# Patient Record
Sex: FEMALE | Race: WHITE | NOT HISPANIC OR LATINO | Employment: OTHER | URBAN - METROPOLITAN AREA
[De-identification: names, ages, dates, MRNs, and addresses within clinical notes are randomized per-mention and may not be internally consistent; named-entity substitution may affect disease eponyms.]

---

## 2017-01-09 ENCOUNTER — OFFICE VISIT (OUTPATIENT)
Dept: PULMONOLOGY | Facility: HOSPICE | Age: 67
End: 2017-01-09
Payer: MEDICARE

## 2017-01-09 ENCOUNTER — OFFICE VISIT (OUTPATIENT)
Dept: PULMONOLOGY | Facility: HOSPICE | Age: 67
End: 2017-01-09
Attending: INTERNAL MEDICINE
Payer: MEDICARE

## 2017-01-09 VITALS
WEIGHT: 106 LBS | DIASTOLIC BLOOD PRESSURE: 70 MMHG | HEIGHT: 60 IN | BODY MASS INDEX: 20.81 KG/M2 | SYSTOLIC BLOOD PRESSURE: 112 MMHG | RESPIRATION RATE: 16 BRPM | HEART RATE: 84 BPM | TEMPERATURE: 97.5 F

## 2017-01-09 VITALS — WEIGHT: 104 LBS | BODY MASS INDEX: 20.31 KG/M2

## 2017-01-09 DIAGNOSIS — C13.9 SQUAMOUS CELL CANCER OF HYPOPHARYNX (HCC): ICD-10-CM

## 2017-01-09 DIAGNOSIS — R06.02 SOB (SHORTNESS OF BREATH): ICD-10-CM

## 2017-01-09 PROCEDURE — 94726 PLETHYSMOGRAPHY LUNG VOLUMES: CPT | Performed by: INTERNAL MEDICINE

## 2017-01-09 PROCEDURE — 94010 BREATHING CAPACITY TEST: CPT | Performed by: INTERNAL MEDICINE

## 2017-01-09 PROCEDURE — 94729 DIFFUSING CAPACITY: CPT | Performed by: INTERNAL MEDICINE

## 2017-01-09 PROCEDURE — 99214 OFFICE O/P EST MOD 30 MIN: CPT | Mod: 25 | Performed by: INTERNAL MEDICINE

## 2017-01-09 ASSESSMENT — 6 MINUTE WALK TEST (6MWT)
SAO2 AT 4 MIN: 97
SAO2 AT 3 MIN: 97
DISTANCE WALKED (FT): 180
HEART RATE AT 5 MIN: 98
NUMBER OF RESTS: 0
PERCEIVED FATIGUE AT 4 MIN: 0
HEART RATE AT 1 MIN: 98
PERCEIVED BREATHLESSNESS AT 6 MIN: 2
PERCEIVED BREATHLESSNESS AT 5 MIN: 2
DISTANCE WALKED (FT): 180
SAO2 AT 5 MIN: 96
PERCEIVED FATIGUE AT 1 MIN: 0
SAO2 AT 6 MIN: 98
PERCEIVED FATIGUE AT 3 MIN: 0
BLOOD PRESSURE AT 1 MIN: 112/64
PERCEIVED BREATHLESSNESS AT 1 MIN: 2
HEART RATE AT 4 MIN: 96
PERCEIVED FATIGUE AT 2 MIN: 0
DISTANCE WALKED (FT): 160
PERCEIVED FATIGUE AT 6 MIN: 0
SAO2 AT 1 MIN: 98
PERCEIVED BREATHLESSNESS AT 2 MIN: 0
DISTANCE WALKED (FT): 220
PERCEIVED BREATHLESSNESS AT 4 MIN: 2
HEART RATE AT 3 MIN: 96
SITTING BLOOD PRESSURE: 110/62
PERCEIVED FATIGUE AT 2 MIN: 0
O2 SAT PERCENT ROOM AIR: 96
AMBULATES WITH O2: WITHOUT O2
COMMENTS: TEST ON ROOM AIR
PERCEIVED FATIGUE AT 1 MIN: 0
PERCEIVED BREATHLESSNESS AT 3 MIN: 2
BLOOD PRESSURE: LEFT ARM
HEART RATE AT 2 MIN: 82
HEART RATE: 80
DISTANCE WALKED (FT): 180
HEART RATE AT 6 MIN: 98
PERCENT OF NORMAL WALKED: 65
PERCEIVED FATIGUE AT 5 MIN: 0
TOTAL DISTANCE WALKED: 1080
PERCEIVED BREATHLESSNESS AT 1 MIN: 2
HEART RATE AT 2 MIN: 96
SAO2 AT 2 MIN: 97
TOTAL REST TIME: 0
SAO2 AT 1 MIN: 97
DISTANCE WALKED (FT): 160
SAO2 AT 2 MIN: 96
HEART RATE AT 1 MIN: 98
PERCEIVED BREATHLESSNESS AT 2 MIN: 2

## 2017-01-09 ASSESSMENT — PULMONARY FUNCTION TESTS
FEV1: 2.58
FEV1_PREDICTED: 2.04
FEV1/FVC: 80
FEV1/FVC_PERCENT_PREDICTED: 105
FEV1_PERCENT_PREDICTED: 126
FVC: 3.24
FVC_PERCENT_PREDICTED: 120
FVC_PREDICTED: 2.68
FEV1/FVC_PERCENT_PREDICTED: 76

## 2017-01-09 NOTE — PROCEDURES
Technician: LARRY Vargas    Technician Comment:  Good patient effort & cooperation.  The results of this test meet the ATS/ERS standards for acceptability & reproducibility.  Test was performed on the [employer] Body Plethysmograph-Elite DX system.  Predicted values were [facility]-3 for spirometry, [facility] for DLCO, ITS for Lung Volumes.  The DLCO was uncorrected for Hgb.  Pt politely declined use of bronchodilators today due to chemical sensitivities.      The FVC is 3.24 L or 120%, FEV1 is 2.58 L or 126%, FEV1/FVC 79%. %. DLCO 150%.    Interpretation:  Normal pulmonary function.  Normal flow volume loop.

## 2017-01-09 NOTE — PROCEDURES
Test on Room Air  This is a 6 minute walking test done in room.  1. Pretest O2 saturation was 96%, pretest blood pressure was 110/62, pretest heart rate was 18 bpm  2. The patient completed 6 minutes walking. He walked 1018 feet which is 65% of normal walked. His maximum heart rate was reached and minute 5 at 98 beats per minutes. His O2 saturation remained above 95% for the entire period. The patient recovered quickly to the baseline. Blood pressure after one minute recovery was 112/64.

## 2017-01-09 NOTE — MR AVS SNAPSHOT
"        Lyssa Jessy Design   2017 12:30 PM   Office Visit   MRN: [id number]    Department:  [facility]   Dept Phone:  [phone]    Description:  Female : 1950   Provider:  Tho Negrete M.D.           Allergies as of 2017     Allergen Noted Reactions    Benadryl [Diphenhydramine Hcl] 2012   Anxiety    Diphenhydramine Hcl 2016       Fentanyl 2012   Nausea    Gabapentin 2016       Percocet [Oxycodone-Acetaminophen] 2012       Phenergan [Promethazine Hcl] 07/15/2012       \" feeling high and weird\"    Prazosin 2016       Suprax 2016       Zoloft 2016         You were diagnosed with     SOB (shortness of breath)   [888689]         Vital Signs     Smoking Status                   Never Smoker            Basic Information     Date Of Birth Sex Race Ethnicity Preferred Language    1950 Female White Non- English      Your appointments     2017  1:00 PM   Pulmonary Function Test with PFT-RM3   [facility] (--)    [address]  [facility] [address]  [place] [id number] [phone]            2017  2:20 PM   Established Patient Pul with Dayna Flor M.D.   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      Below and/or attached are the medications your provider expects you to take. Review all of your home medications and newly ordered medications with your provider and/or pharmacist. Follow medication instructions as directed by your provider and/or pharmacist. Please keep your medication list with you and share with your provider. Update the information when medications are discontinued, doses are changed, or new medications (including over-the-counter products) are added; and carry medication information at all times in the event of emergency situations     Allergies:  BENADRYL - Anxiety     DIPHENHYDRAMINE HCL - (reactions not documented)     FENTANYL - Nausea     GABAPENTIN - (reactions not documented)     PERCOCET - (reactions not documented)     PHENERGAN - (reactions not documented)     PRAZOSIN - (reactions not documented)     SUPRAX - (reactions not documented)     ZOLOFT - (reactions not documented)               Medications  Valid as of: January 09, 2017 - 12:54 PM    Generic Name Brand Name Tablet Size Instructions for use    5-Hydroxytryptophan (Cap) 5- MG Take 1 mg by mouth every day.        Amantadine HCl (Cap) SYMMETREL 100 MG Take 100 mg by mouth 2 times a day.        Ascorbic Acid (Tab) Vitamin C 1000 MG Take 1,000 mg by mouth every day.        Astaxanthin (Cap) Astaxanthin 4 MG Take 1 tablet by mouth 3 times a day.        Cetuximab (Solution) ERBITUX 100 MG/50ML by Intravenous route.        Cholecalciferol (Tab) Vitamin D3 5000 UNITS Take 1 tablet by mouth every day.        Coenzyme Q10 (Cap) CoQ10 100 MG Take 1 tablet by mouth every day.        GuaiFENesin (TABLET SR 12 HR) MUCINEX 600 MG Take 600 mg by mouth every 12 hours.        Hydrocodone-Acetaminophen (Solution) LORTAB 7.5-500 MG/15ML Take 10 mL by mouth 4 times a day as needed.         HYDROmorphone HCl (Tab) DILAUDID 2 MG Take 2-4 mg by mouth every 3 hours as needed. maximum 12tabs in 24 hours         L-Methylfolate   Take 15 mg by mouth every day.        Levothyroxine Sodium (Tab) SYNTHROID 50 MCG Take 50 mcg by mouth Every morning on an empty stomach.        Loratadine (Tab) CLARITIN 10 MG Take 10 mg by mouth Once. Prior to chemo        LORazepam (Tab) ATIVAN 1 MG Take 1 mg by mouth 1 time daily as needed. Prior to radiation treatment.        maalox plus-benadryl-xylocaine (MBX) (Suspension) MBX  Take 5 mL by mouth every 6 hours as needed.        Melatonin (Tab) Melatonin 5 MG Take 5 mg by mouth every evening.        Metoclopramide HCl (Solution) REGLAN 5 MG/5ML Take 5 mg by mouth 4 Times a Day,Before Meals and at Bedtime.        ondansetron (ZOFRAN) 32 mg/50 mL SOLN (Solution) ZOFRAN 32 mg/50 mL 32 mg by Intravenous route Once.        Ondansetron HCl (Tab) ZOFRAN 8 MG Take 1 Tab by mouth every 8 hours as needed for Nausea/Vomiting.        Prochlorperazine (Suppos) COMPAZINE 25 MG Insert 1 Suppository in rectum every 6 hours as needed for Nausea/Vomiting.        .                 Medicines prescribed today were sent to:     None      Medication refill instructions:       If your prescription bottle indicates you have medication refills left, it is not necessary to call your provider’s office. Please contact your pharmacy and they will refill your medication.    If your prescription bottle indicates you do not have any refills left, you may request refills at any time through one of the following ways: The online [employer] system (except Urgent Care), by calling your provider’s office, or by asking your pharmacy to contact your provider’s office with a refill request. Medication refills are processed only during regular business hours and may not be available until the next business day. Your provider may request additional information or to have a follow-up visit with you prior to refilling your  medication.   *Please Note: Medication refills are assigned a new Rx number when refilled electronically. Your pharmacy may indicate that no refills were authorized even though a new prescription for the same medication is available at the pharmacy. Please request the medicine by name with the pharmacy before contacting your provider for a refill.           [email or web address] Access Code: Activation code not generated  Current [email or web address] Status: Active

## 2017-01-09 NOTE — MR AVS SNAPSHOT
"        Lyssa Jiménez Design   2017 1:00 PM   Office Visit   MRN: [id number]    Department:  [facility]   Dept Phone:  [phone]    Description:  Female : 1950   Provider:  Dayna Flor M.D.           Allergies as of 2017     Allergen Noted Reactions    Benadryl [Diphenhydramine Hcl] 2012   Anxiety    Diphenhydramine Hcl 2016       Fentanyl 2012   Nausea    Gabapentin 2016       Percocet [Oxycodone-Acetaminophen] 2012       Phenergan [Promethazine Hcl] 07/15/2012       \" feeling high and weird\"    Prazosin 2016       Remeron [Mirtazapine] 2017       Mouth sores    Suprax 2016       Zoloft 2016       Muscle stiffness and twicthing      You were diagnosed with     SOB (shortness of breath)   [196040]         Vital Signs     Weight Smoking Status                47.174 kg (104 lb) Never Smoker           Basic Information     Date Of Birth Sex Race Ethnicity Preferred Language    1950 Female White Non- English      Your appointments     2017  2:20 PM   Established Patient Pul with Dayna Flor M.D.   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 Review all of your home medications and newly ordered medications with your provider and/or pharmacist. Follow medication instructions as directed by your provider and/or pharmacist. Please keep your medication list with you and share with your provider. Update the information when medications are discontinued, doses are changed, or new medications (including over-the-counter products) are added; and carry medication information at all times in the event of emergency situations     Allergies:  BENADRYL - Anxiety     DIPHENHYDRAMINE HCL - (reactions not documented)     FENTANYL - Nausea     GABAPENTIN - (reactions not documented)     PERCOCET - (reactions not documented)     PHENERGAN - (reactions not documented)     PRAZOSIN - (reactions not documented)     REMERON - (reactions not documented)     SUPRAX - (reactions not documented)     ZOLOFT - (reactions not documented)               Medications  Valid as of: January 09, 2017 -  2:07 PM    Generic Name Brand Name Tablet Size Instructions for use    5-Hydroxytryptophan (Cap) 5- MG Take 1 mg by mouth every day.        Amantadine HCl (Cap) SYMMETREL 100 MG Take 100 mg by mouth 2 times a day.        Ascorbic Acid (Tab) Vitamin C 1000 MG Take 1,000 mg by mouth every day.        Astaxanthin (Cap) Astaxanthin 4 MG Take 1 tablet by mouth 4 times a day as needed.        Cetuximab (Solution) ERBITUX 100 MG/50ML by Intravenous route.        Cholecalciferol (Tab) Vitamin D3 5000 UNITS Take 1 tablet by mouth every day.        Coenzyme Q10 (Cap) CoQ10 100 MG Take 1 tablet by mouth every day.        GuaiFENesin (TABLET SR 12 HR) MUCINEX 600 MG Take 600 mg by mouth every 12 hours.        Hydrocodone-Acetaminophen (Solution) LORTAB 7.5-500 MG/15ML Take 10 mL by mouth 4 times a day as needed.       HYDROmorphone HCl (Tab) DILAUDID 2 MG Take 2-4 mg by mouth every 3 hours as needed. maximum 12tabs in 24 hours         L-Methylfolate   Take 15 mg by mouth every day.        L-Methylfolate (Tab) L-methylfolate Calcium 15 MG TK 1 T PO QD        Levothyroxine Sodium (Tab) SYNTHROID 50 MCG Take 50 mcg by mouth Every morning on an empty stomach.        Loratadine (Tab) CLARITIN 10 MG Take 10 mg by mouth Once. Prior to chemo        LORazepam (Tab) ATIVAN 1 MG Take 1 mg by mouth 1 time daily as needed. Prior to radiation treatment.        maalox plus-benadryl-xylocaine (MBX) (Suspension) MBX  Take 5 mL by mouth every 6 hours as needed.        Melatonin (Tab) Melatonin 5 MG Take 15 mg by mouth every evening.        Metoclopramide HCl (Solution) REGLAN 5 MG/5ML Take 5 mg by mouth 4 Times a Day,Before Meals and at Bedtime.        ondansetron (ZOFRAN) 32 mg/50 mL SOLN (Solution) ZOFRAN 32 mg/50 mL 32 mg by Intravenous route Once.        Ondansetron HCl (Tab) ZOFRAN 8 MG Take 1 Tab by mouth every 8 hours as needed for Nausea/Vomiting.        Prochlorperazine (Suppos) COMPAZINE 25 MG Insert 1 Suppository in rectum every 6 hours as needed for Nausea/Vomiting.        .                 Medicines prescribed today were sent to:     [facility] [address] [place] [address] [address] [facility] [address] [facility] [address]    [address] [facility] [address]    Phone: [phone] Fax: [phone]    Open 24 Hours?: No      Medication refill instructions:       If your prescription bottle indicates you have medication refills left, it is not necessary to call your provider’s office. Please contact your pharmacy and they will refill your medication.    If your prescription bottle indicates you do not have any refills left, you may request refills at any time through one of the following ways: The online [employer] system (except Urgent Care), by calling your provider’s office, or by asking your pharmacy to contact your provider’s  office with a refill request. Medication refills are processed only during regular business hours and may not be available until the next business day. Your provider may request additional information or to have a follow-up visit with you prior to refilling your medication.   *Please Note: Medication refills are assigned a new Rx number when refilled electronically. Your pharmacy may indicate that no refills were authorized even though a new prescription for the same medication is available at the pharmacy. Please request the medicine by name with the pharmacy before contacting your provider for a refill.           [email or web address] Access Code: Activation code not generated  Current [email or web address] Status: Active

## 2017-01-09 NOTE — PROGRESS NOTES
"Chief Complaint   Patient presents with   • Results     PFT,6MW results.       HPI: This patient is a 66 y.o. Female who returns for shortness of breath. 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 She had smoked marijuana for many years, denies any tobacco use. Following her treatments, she had felt short of breath with activities such as \"walking and talking\". She also had a cough. She denied associated chest pain, wheezing or hemoptysis. All of these symptoms have improved spontaneously in recent months. She is more ambulatory, exercising more and feeling better. She denies any aspiration however has to be cautious with swallowing. She denies a history of childhood asthma, bronchitis, or pneumonia. Pulmonary function testing today showed normal lung function, with FEV1 2.58 L or 126%. DLCO was normal at 150%. 6 minute walk test showed excellent oxygen saturations with exercise, and she walked over 1000 feet.  She is followed by Dr. Noel, ENT and Dr. Childers, radiation oncologist.  Chest CAT scan 02/16 showed:  1. Hyperexpanded lungs and apical scarring. No consolidation or mass identified.  2. 5.9 mm short axis subcarinal and smaller mediastinal and hilar lymph nodes.      Past Medical History   Diagnosis Date   • Arthritis    • Snoring    • Pain 05-25-12     Left neck, head, left ear, 6/10   • Psychiatric problem      depression   • Scoliosis    • TMJ syndrome    • Cancer (HCC) 05-25-12     squamos cell in base of tongue   • Back pain    • Depression    • Hypothyroidism    • Chickenpox    • [place] measles    • Influenza    • Mumps    • Fatigue    • Wears glasses    • Painful joint    • Sore muscles    • Dizziness        Social History     Social History   • Marital Status:      Spouse Name: N/A   • Number of Children: N/A   • Years of Education: N/A     Occupational History   • Not " on file.     Social History Main Topics   • Smoking status: Never Smoker    • Smokeless tobacco: Never Used   • Alcohol Use: No   • Drug Use: No   • Sexual Activity: Not on file     Other Topics Concern   • Not on file     Social History Narrative       Family History   Problem Relation Age of Onset   • Lung Cancer Father    • Cancer Brother    • Breast Cancer Daughter        Current Outpatient Prescriptions on File Prior to Visit   Medication Sig Dispense Refill   • Astaxanthin 4 MG Cap Take 1 tablet by mouth 4 times a day as needed.     • levothyroxine (SYNTHROID) 50 MCG Tab Take 50 mcg by mouth Every morning on an empty stomach.     • L-METHYLFOLATE PO Take 15 mg by mouth every day.     • 5-Hydroxytryptophan (5-HTP) 100 MG Cap Take 1 mg by mouth every day.     • Melatonin 5 MG Tab Take 15 mg by mouth every evening.     • Coenzyme Q10 (COQ10) 100 MG Cap Take 1 tablet by mouth every day.     • Cholecalciferol (VITAMIN D3) 5000 UNITS Tab Take 1 tablet by mouth every day.     • Ascorbic Acid (VITAMIN C) 1000 MG Tab Take 1,000 mg by mouth every day.     • loratadine (CLARITIN) 10 MG TABS Take 10 mg by mouth Once. Prior to chemo     • hydrocodone-acetaminophen 2.5-167 mg/5 mL solution (LORTAB) 7.5-500 MG/15ML SOLN Take 10 mL by mouth 4 times a day as needed.     • maalox plus-benadryl-xylocaine (MBX) Take 5 mL by mouth every 6 hours as needed.     • metoclopramide (REGLAN) 5 MG/5ML SOLN Take 5 mg by mouth 4 Times a Day,Before Meals and at Bedtime.     • amantadine (SYMMETREL) 100 MG CAPS Take 100 mg by mouth 2 times a day.     • guaifenesin LA (MUCINEX) 600 MG TB12 Take 600 mg by mouth every 12 hours.     • cetuximab (ERBITUX) 100 MG/50ML SOLN by Intravenous route.     • ondansetron (ZOFRAN) 32 mg/50 mL SOLN 32 mg by Intravenous route Once.     • HYDROmorphone (DILAUDID) 2 MG TABS Take 2-4 mg by mouth every 3 hours as needed. maximum 12tabs in 24 hours      • ondansetron (ZOFRAN) 8 MG TABS Take 1 Tab by mouth every 8  hours as needed for Nausea/Vomiting. (Patient not taking: Reported on 1/9/2017) 20 Each 0   • prochlorperazine (COMPAZINE) 25 MG SUPP Insert 1 Suppository in rectum every 6 hours as needed for Nausea/Vomiting. (Patient not taking: Reported on 1/9/2017) 10 Each 0   • lorazepam (ATIVAN) 1 MG TABS Take 1 mg by mouth 1 time daily as needed. Prior to radiation treatment.       No current facility-administered medications on file prior to visit.       Allergies: Benadryl; Diphenhydramine hcl; Fentanyl; Gabapentin; Percocet; Phenergan; Prazosin; Remeron; Suprax; and Zoloft    ROS:   Constitutional: Denies fevers, chills, night sweats, fatigue or weight loss  Eyes: Denies vision loss, pain, drainage, double vision  Ears, Nose, Throat: Denies earache, difficulty hearing, tinnitus, nasal congestion, hoarseness  Cardiovascular: Denies chest pain, tightness, palpitations, orthopnea or edema  Respiratory: Denies shortness of breath, cough, wheezing, hemoptysis  Sleep: Denies daytime sleepiness, snoring, apneas, insomnia, morning headaches  GI: Denies heartburn, dysphagia, nausea, abdominal pain, diarrhea or constipation  : Denies frequent urination, hematuria, discharge or painful urination  Musculoskeletal: Denies back pain, painful joints, sore muscles  Neurological: Denies weakness or headaches  Skin: No rashes    Blood pressure 112/70, pulse 84, temperature 36.4 °C (97.5 °F), temperature source Temporal, resp. rate 16, height 1.524 m (5'), weight 48.081 kg (106 lb).  Multi-Ox Readings  Multi Ox #1     O2 sat % at rest     O2 sat % on exertion     O2 sat average on exertion     Multi Ox #2     O2 sat % at rest     O2 sat % on exertion     O2 sat average on exertion       Oxygen Use     Oxygen Frequency     Duration of need     Is the patient mobile within the home?     CPAP Use?     BIPAP Use?     Servo Titration         Physical Exam:  Appearance: Well-nourished, well-developed, in no acute distress  HEENT: Normocephalic,  atraumatic, white sclera, PERRLA, oropharynx clear  Neck: No adenopathy or masses  Respiratory: no intercostal retractions or accessory muscle use  Lungs auscultation: Clear to auscultation bilaterally  Cardiovascular: Regular rate rhythm. No murmurs, rubs or gallops.  No LE edema  Abdomen: soft, nondistended  Gait: Normal  Digits: No clubbing, cyanosis  Motor: No focal deficits  Orientation: Oriented to time, person and place    Diagnosis:  1. Squamous cell cancer of hypopharynx (HCC)         Plan:  The patient's respiratory symptoms have spontaneously improved. Her pulmonary function is normal. 6 minute walk test shows excellent oxygen saturations. Former chest CAT scan showed mild apical scarring which could be secondary to her radiation therapy versus former smoking, however is of clinical insignificant.  Avoidance of all smoking was encouraged.   She was encouraged to maintain follow-up with her ENT and oncologist. I defer to their expertise regarding the timing of any future neck/chest CAT scans.  Return if symptoms worsen or fail to improve.

## 2017-01-09 NOTE — MR AVS SNAPSHOT
"        Lyssa [employer]   2017 2:20 PM   Office Visit   MRN: [id number]    Department:  [facility]   Dept Phone:  [phone]    Description:  Female : 1950   Provider:  Dayna Flor M.D.           Reason for Visit     Results PFT,6MW results.      Allergies as of 2017     Allergen Noted Reactions    Benadryl [Diphenhydramine Hcl] 2012   Anxiety    Diphenhydramine Hcl 2016       Fentanyl 2012   Nausea    Gabapentin 2016       Percocet [Oxycodone-Acetaminophen] 2012       Phenergan [Promethazine Hcl] 07/15/2012       \" feeling high and weird\"    Prazosin 2016       Remeron [Mirtazapine] 2017       Mouth sores    Suprax 2016       Zoloft 2016       Muscle stiffness and twicthing      You were diagnosed with     Squamous cell cancer of hypopharynx (HCC)   [[id number]]         Vital Signs     Blood Pressure Pulse Temperature Respirations Height Weight    112/70 mmHg 84 36.4 °C (97.5 °F) (Temporal) 16 1.524 m (5') 48.081 kg (106 lb)    Body Mass Index Smoking Status                20.70 kg/m2 Never Smoker           Basic Information     Date Of Birth Sex Race Ethnicity Preferred Language    1950 Female White Non- English      Your appointments     2017  2:20 PM   Established Patient Pul with Dayna Flor M.D.   [facility] (--)    [address] [place] [address]  [facility] [id number] [phone]              Problem List              ICD-10-CM Priority Class Noted - Resolved    Dehydration E86.0   7/15/2012 - Present    Hypokalemia, gastrointestinal losses E87.6   7/15/2012 - Present    Debility R53.81   7/15/2012 - Present    Syncope R55   7/15/2012 - Present    Squamous cell cancer of hypopharynx (HCC) C13.9   2012 - Present      Health Maintenance        Date Due Completion Dates    IMM DTaP/Tdap/Td Vaccine (1 - Tdap) 1970 ---    PAP SMEAR 1972 ---    MAMMOGRAM 1991 --- "    COLONOSCOPY 11/18/2001 ---    IMM ZOSTER VACCINE 11/18/2011 ---    IMM INFLUENZA (1) 9/1/2016 ---    BONE DENSITY 11/18/2016 ---    IMM PNEUMOCOCCAL 65+ (ADULT) LOW/MEDIUM RISK SERIES (1 of 2 - PCV13) 11/18/2016 ---            Current Immunizations     Influenza TIV (IM)  Deferred (Patient Refused)      Below and/or attached are the medications your provider expects you to take. Review all of your home medications and newly ordered medications with your provider and/or pharmacist. Follow medication instructions as directed by your provider and/or pharmacist. Please keep your medication list with you and share with your provider. Update the information when medications are discontinued, doses are changed, or new medications (including over-the-counter products) are added; and carry medication information at all times in the event of emergency situations     Allergies:  BENADRYL - Anxiety     DIPHENHYDRAMINE HCL - (reactions not documented)     FENTANYL - Nausea     GABAPENTIN - (reactions not documented)     PERCOCET - (reactions not documented)     PHENERGAN - (reactions not documented)     PRAZOSIN - (reactions not documented)     REMERON - (reactions not documented)     SUPRAX - (reactions not documented)     ZOLOFT - (reactions not documented)               Medications  Valid as of: January 09, 2017 -  2:13 PM    Generic Name Brand Name Tablet Size Instructions for use    5-Hydroxytryptophan (Cap) 5- MG Take 1 mg by mouth every day.        Amantadine HCl (Cap) SYMMETREL 100 MG Take 100 mg by mouth 2 times a day.        Ascorbic Acid (Tab) Vitamin C 1000 MG Take 1,000 mg by mouth every day.        Astaxanthin (Cap) Astaxanthin 4 MG Take 1 tablet by mouth 4 times a day as needed.        Cetuximab (Solution) ERBITUX 100 MG/50ML by Intravenous route.        Cholecalciferol (Tab) Vitamin D3 5000 UNITS Take 1 tablet by mouth every day.        Coenzyme Q10 (Cap) CoQ10 100 MG Take 1 tablet by mouth every day.           GuaiFENesin (TABLET SR 12 HR) MUCINEX 600 MG Take 600 mg by mouth every 12 hours.        Hydrocodone-Acetaminophen (Solution) LORTAB 7.5-500 MG/15ML Take 10 mL by mouth 4 times a day as needed.        HYDROmorphone HCl (Tab) DILAUDID 2 MG Take 2-4 mg by mouth every 3 hours as needed. maximum 12tabs in 24 hours         L-Methylfolate   Take 15 mg by mouth every day.        L-Methylfolate (Tab) L-methylfolate Calcium 15 MG TK 1 T PO QD        Levothyroxine Sodium (Tab) SYNTHROID 50 MCG Take 50 mcg by mouth Every morning on an empty stomach.        Loratadine (Tab) CLARITIN 10 MG Take 10 mg by mouth Once. Prior to chemo        LORazepam (Tab) ATIVAN 1 MG Take 1 mg by mouth 1 time daily as needed. Prior to radiation treatment.        maalox plus-benadryl-xylocaine (MBX) (Suspension) MBX  Take 5 mL by mouth every 6 hours as needed.        Melatonin (Tab) Melatonin 5 MG Take 15 mg by mouth every evening.        Metoclopramide HCl (Solution) REGLAN 5 MG/5ML Take 5 mg by mouth 4 Times a Day,Before Meals and at Bedtime.        ondansetron (ZOFRAN) 32 mg/50 mL SOLN (Solution) ZOFRAN 32 mg/50 mL 32 mg by Intravenous route Once.        Ondansetron HCl (Tab) ZOFRAN 8 MG Take 1 Tab by mouth every 8 hours as needed for Nausea/Vomiting.        Prochlorperazine (Suppos) COMPAZINE 25 MG Insert 1 Suppository in rectum every 6 hours as needed for Nausea/Vomiting.        .                 Medicines prescribed today were sent to:     [id number]  [place], [place] - [address] AT [facility] [address]    [address] [place] [address]    Phone: [phone] Fax: [phone]    Open 24 Hours?: No      Medication refill instructions:       If your prescription bottle indicates you have medication refills left, it is not necessary to call your provider’s office. Please contact your pharmacy and they will refill your medication.    If your prescription bottle indicates you do not have any refills left, you may  request refills at any time through one of the following ways: The online [employer] system (except Urgent Care), by calling your provider’s office, or by asking your pharmacy to contact your provider’s office with a refill request. Medication refills are processed only during regular business hours and may not be available until the next business day. Your provider may request additional information or to have a follow-up visit with you prior to refilling your medication.   *Please Note: Medication refills are assigned a new Rx number when refilled electronically. Your pharmacy may indicate that no refills were authorized even though a new prescription for the same medication is available at the pharmacy. Please request the medicine by name with the pharmacy before contacting your provider for a refill.           [employer] Access Code: Activation code not generated  Current [employer] Status: Active

## 2017-03-16 ENCOUNTER — HOSPITAL ENCOUNTER (OUTPATIENT)
Dept: RADIATION ONCOLOGY | Facility: MEDICAL CENTER | Age: 67
End: 2017-03-31
Attending: RADIOLOGY
Payer: MEDICARE

## 2017-03-16 VITALS
TEMPERATURE: 98.4 F | WEIGHT: 102.9 LBS | DIASTOLIC BLOOD PRESSURE: 84 MMHG | SYSTOLIC BLOOD PRESSURE: 140 MMHG | RESPIRATION RATE: 16 BRPM | OXYGEN SATURATION: 100 % | BODY MASS INDEX: 20.1 KG/M2 | HEART RATE: 84 BPM

## 2017-03-16 PROCEDURE — 31575 DIAGNOSTIC LARYNGOSCOPY: CPT | Performed by: RADIOLOGY

## 2017-03-16 PROCEDURE — 99214 OFFICE O/P EST MOD 30 MIN: CPT | Mod: 25 | Performed by: RADIOLOGY

## 2017-03-16 PROCEDURE — 99212 OFFICE O/P EST SF 10 MIN: CPT | Mod: 25 | Performed by: RADIOLOGY

## 2017-03-16 NOTE — PROGRESS NOTES
RADIATION ONCOLOGY FOLLOW-UP    DATE OF SERVICE:   3/16/2017    IDENTIFICATION:   A 66 y.o. 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 She is status post concurrent Erbitux and radiotherapy; right 35 fractions completed August 31, 2012.    HISTORY OF PRESENT ILLNESS:   Returns for for follow-up generally feels well. She is complaining of jaw pain right greater than left. Workup is demonstrated degeneration.  Xerostomia 5 ( 0-10, 0=completely dry), Taste 9 ( 0-10, 0=no taste), Dysphagia 5 requires water when swallowing, Odynophagia 0    CURRENT MEDICATIONS:  Current Outpatient Prescriptions   Medication Sig Dispense Refill   • L-methylfolate Calcium 15 MG Tab TK 1 T PO QD  12   • Astaxanthin 4 MG Cap Take 1 tablet by mouth 4 times a day as needed.     • levothyroxine (SYNTHROID) 50 MCG Tab Take 50 mcg by mouth Every morning on an empty stomach.     • L-METHYLFOLATE PO Take 15 mg by mouth every day.     • 5-Hydroxytryptophan (5-HTP) 100 MG Cap Take 1 mg by mouth every day.     • Melatonin 5 MG Tab Take 15 mg by mouth every evening.     • Coenzyme Q10 (COQ10) 100 MG Cap Take 1 tablet by mouth every day.     • Cholecalciferol (VITAMIN D3) 5000 UNITS Tab Take 1 tablet by mouth every day.     • Ascorbic Acid (VITAMIN C) 1000 MG Tab Take 1,000 mg by mouth every day.       No current facility-administered medications for this encounter.       ALLERGIES:  Benadryl; Diphenhydramine hcl; Fentanyl; Gabapentin; Percocet; Phenergan; Prazosin; Remeron; Suprax; and Zoloft    PHYSICAL EXAM:   /84 mmHg  Pulse 84  Temp(Src) 36.9 °C (98.4 °F)  Resp 16  Wt 46.675 kg (102 lb 14.4 oz)  SpO2 100%  GENERAL: Alert oriented in no acute distress  HEENT:  Pupils are equal, round, and reactive to light.  Extraocular muscles   are intact. Sclerae nonicteric.  Conjunctivae pink.  Oral cavity, tongue   protrudes midline.   NECK:  Supple without evidence of  "thyromegaly.  NODES:  No peripheral adenopathy of the neck, supraclavicular fossa or axillae   bilaterally.  LUNGS:  Clear to ascultation and resonant to percussion.  HEART:  Regular rate and rhythm.  No murmur appreciated    Pain Scale: 0-10  Pain Assessement: Initial  Pain Location, Orientation and Scale: Jaw: Right : Chronic  : 4  What makes the pain better: physical therapy  What makes the pain worse: chewing/talking (teeth will hit together)    FIBEROPTIC EXAM:  Flexible fiberoptic exam after anesthesia of left nostril and oropharynx  demostrated normal nasopharyngeal structures including the   opening of the eustachian canal, torus and fossa of Rosenmuller bilaterally.    The oropharynx, the base of tongue, vallecula, epiglottis, PE folds appear   normal.  Laryngeal structures, the AE folds, false vocal folds, arytenoids   appeared normal.  Cords meet at midline on the phonation of vowel E.  Piriform  sinuses showed no masses.      RADIOLOGY DATA:  No results found.    IMPRESSION:    A 66 y.o. with P 16 positive, stage IV A, squamous cell carcinoma left base of tongue\" without evidence of disease.    RECOMMENDATIONS:   Reviewed video laryngoscopic exam with patient and reassured her. At this point on discharging her from the clinic and will see her in follow-up on an as-needed basis.    25 minutes was spent face-to-face with patient in the office and more than half of that time was spent counseling patient or coordinating care as described above.    Thank you for the opportunity to participate in her care.  If any questions or comments, please do not hesitate in calling.    Juventino STEPHENSON M.D.  Electronically signed by: Juventino Childers V, 3/16/2017 4:16 PM  [phone]    "

## 2017-04-28 ENCOUNTER — HOSPITAL ENCOUNTER (OUTPATIENT)
Dept: LAB | Facility: MEDICAL CENTER | Age: 67
End: 2017-04-28
Attending: NURSE PRACTITIONER
Payer: MEDICARE

## 2017-04-28 LAB
ALBUMIN SERPL BCP-MCNC: 4.7 G/DL (ref 3.2–4.9)
ALBUMIN/GLOB SERPL: 1.7 G/DL
ALP SERPL-CCNC: 88 U/L (ref 30–99)
ALT SERPL-CCNC: 17 U/L (ref 2–50)
ANION GAP SERPL CALC-SCNC: 7 MMOL/L (ref 0–11.9)
AST SERPL-CCNC: 18 U/L (ref 12–45)
BASOPHILS # BLD AUTO: 1.3 % (ref 0–1.8)
BASOPHILS # BLD: 0.05 K/UL (ref 0–0.12)
BILIRUB SERPL-MCNC: 0.6 MG/DL (ref 0.1–1.5)
BUN SERPL-MCNC: 14 MG/DL (ref 8–22)
CALCIUM SERPL-MCNC: 9.8 MG/DL (ref 8.5–10.5)
CHLORIDE SERPL-SCNC: 106 MMOL/L (ref 96–112)
CHOLEST SERPL-MCNC: 247 MG/DL (ref 100–199)
CO2 SERPL-SCNC: 29 MMOL/L (ref 20–33)
CREAT SERPL-MCNC: 0.6 MG/DL (ref 0.5–1.4)
EOSINOPHIL # BLD AUTO: 0.29 K/UL (ref 0–0.51)
EOSINOPHIL NFR BLD: 7.4 % (ref 0–6.9)
ERYTHROCYTE [DISTWIDTH] IN BLOOD BY AUTOMATED COUNT: 44.2 FL (ref 35.9–50)
GFR SERPL CREATININE-BSD FRML MDRD: >60 ML/MIN/1.73 M 2
GLOBULIN SER CALC-MCNC: 2.7 G/DL (ref 1.9–3.5)
GLUCOSE SERPL-MCNC: 87 MG/DL (ref 65–99)
HCT VFR BLD AUTO: 43 % (ref 37–47)
HDLC SERPL-MCNC: 75 MG/DL
HGB BLD-MCNC: 14.2 G/DL (ref 12–16)
IMM GRANULOCYTES # BLD AUTO: 0.01 K/UL (ref 0–0.11)
IMM GRANULOCYTES NFR BLD AUTO: 0.3 % (ref 0–0.9)
LDLC SERPL CALC-MCNC: 159 MG/DL
LYMPHOCYTES # BLD AUTO: 1.01 K/UL (ref 1–4.8)
LYMPHOCYTES NFR BLD: 25.6 % (ref 22–41)
MCH RBC QN AUTO: 31.1 PG (ref 27–33)
MCHC RBC AUTO-ENTMCNC: 33 G/DL (ref 33.6–35)
MCV RBC AUTO: 94.3 FL (ref 81.4–97.8)
MONOCYTES # BLD AUTO: 0.45 K/UL (ref 0–0.85)
MONOCYTES NFR BLD AUTO: 11.4 % (ref 0–13.4)
NEUTROPHILS # BLD AUTO: 2.13 K/UL (ref 2–7.15)
NEUTROPHILS NFR BLD: 54 % (ref 44–72)
NRBC # BLD AUTO: 0 K/UL
NRBC BLD AUTO-RTO: 0 /100 WBC
PLATELET # BLD AUTO: 324 K/UL (ref 164–446)
PMV BLD AUTO: 9.5 FL (ref 9–12.9)
POTASSIUM SERPL-SCNC: 3.7 MMOL/L (ref 3.6–5.5)
PROT SERPL-MCNC: 7.4 G/DL (ref 6–8.2)
RBC # BLD AUTO: 4.56 M/UL (ref 4.2–5.4)
SODIUM SERPL-SCNC: 142 MMOL/L (ref 135–145)
T4 FREE SERPL-MCNC: 0.87 NG/DL (ref 0.53–1.43)
TRIGL SERPL-MCNC: 66 MG/DL (ref 0–149)
TSH SERPL DL<=0.005 MIU/L-ACNC: 6.33 UIU/ML (ref 0.3–3.7)
WBC # BLD AUTO: 3.9 K/UL (ref 4.8–10.8)

## 2017-04-28 PROCEDURE — 80061 LIPID PANEL: CPT

## 2017-04-28 PROCEDURE — 84439 ASSAY OF FREE THYROXINE: CPT

## 2017-04-28 PROCEDURE — 85025 COMPLETE CBC W/AUTO DIFF WBC: CPT

## 2017-04-28 PROCEDURE — 84443 ASSAY THYROID STIM HORMONE: CPT

## 2017-04-28 PROCEDURE — 36415 COLL VENOUS BLD VENIPUNCTURE: CPT

## 2017-04-28 PROCEDURE — 80053 COMPREHEN METABOLIC PANEL: CPT

## 2017-05-10 ENCOUNTER — HOSPITAL ENCOUNTER (OUTPATIENT)
Dept: RADIOLOGY | Facility: MEDICAL CENTER | Age: 67
End: 2017-05-10
Attending: NURSE PRACTITIONER
Payer: MEDICARE

## 2017-05-10 DIAGNOSIS — R42 DIZZINESS AND GIDDINESS: ICD-10-CM

## 2017-05-10 PROCEDURE — 93880 EXTRACRANIAL BILAT STUDY: CPT

## 2021-03-03 DIAGNOSIS — Z23 NEED FOR VACCINATION: ICD-10-CM

## 2024-02-09 ENCOUNTER — HOSPITAL ENCOUNTER (OUTPATIENT)
Dept: LAB | Facility: MEDICAL CENTER | Age: 74
End: 2024-02-09
Attending: NURSE PRACTITIONER
Payer: MEDICARE

## 2024-02-09 LAB
25(OH)D3 SERPL-MCNC: 65 NG/ML (ref 30–100)
ALBUMIN SERPL BCP-MCNC: 4.5 G/DL (ref 3.2–4.9)
ALBUMIN/GLOB SERPL: 1.7 G/DL
ALP SERPL-CCNC: 88 U/L (ref 30–99)
ALT SERPL-CCNC: 15 U/L (ref 2–50)
ANION GAP SERPL CALC-SCNC: 9 MMOL/L (ref 7–16)
AST SERPL-CCNC: 17 U/L (ref 12–45)
BILIRUB SERPL-MCNC: 0.3 MG/DL (ref 0.1–1.5)
BUN SERPL-MCNC: 15 MG/DL (ref 8–22)
CALCIUM ALBUM COR SERPL-MCNC: 9.1 MG/DL (ref 8.5–10.5)
CALCIUM SERPL-MCNC: 9.5 MG/DL (ref 8.5–10.5)
CHLORIDE SERPL-SCNC: 103 MMOL/L (ref 96–112)
CHOLEST SERPL-MCNC: 220 MG/DL (ref 100–199)
CO2 SERPL-SCNC: 26 MMOL/L (ref 20–33)
CREAT SERPL-MCNC: 0.43 MG/DL (ref 0.5–1.4)
ERYTHROCYTE [DISTWIDTH] IN BLOOD BY AUTOMATED COUNT: 44.6 FL (ref 35.9–50)
GFR SERPLBLD CREATININE-BSD FMLA CKD-EPI: 103 ML/MIN/1.73 M 2
GLOBULIN SER CALC-MCNC: 2.6 G/DL (ref 1.9–3.5)
GLUCOSE SERPL-MCNC: 88 MG/DL (ref 65–99)
HCT VFR BLD AUTO: 40.7 % (ref 37–47)
HDLC SERPL-MCNC: 69 MG/DL
HGB BLD-MCNC: 13.8 G/DL (ref 12–16)
LDLC SERPL CALC-MCNC: 139 MG/DL
MCH RBC QN AUTO: 32.1 PG (ref 27–33)
MCHC RBC AUTO-ENTMCNC: 33.9 G/DL (ref 32.2–35.5)
MCV RBC AUTO: 94.7 FL (ref 81.4–97.8)
PLATELET # BLD AUTO: 395 K/UL (ref 164–446)
PMV BLD AUTO: 9.8 FL (ref 9–12.9)
POTASSIUM SERPL-SCNC: 4 MMOL/L (ref 3.6–5.5)
PROT SERPL-MCNC: 7.1 G/DL (ref 6–8.2)
RBC # BLD AUTO: 4.3 M/UL (ref 4.2–5.4)
SODIUM SERPL-SCNC: 138 MMOL/L (ref 135–145)
TRIGL SERPL-MCNC: 60 MG/DL (ref 0–149)
TSH SERPL DL<=0.005 MIU/L-ACNC: 3.15 UIU/ML (ref 0.38–5.33)
VIT B12 SERPL-MCNC: 1226 PG/ML (ref 211–911)
WBC # BLD AUTO: 4.2 K/UL (ref 4.8–10.8)

## 2024-02-09 PROCEDURE — 84443 ASSAY THYROID STIM HORMONE: CPT

## 2024-02-09 PROCEDURE — 82607 VITAMIN B-12: CPT

## 2024-02-09 PROCEDURE — 80061 LIPID PANEL: CPT

## 2024-02-09 PROCEDURE — 80053 COMPREHEN METABOLIC PANEL: CPT

## 2024-02-09 PROCEDURE — 82306 VITAMIN D 25 HYDROXY: CPT

## 2024-02-09 PROCEDURE — 85027 COMPLETE CBC AUTOMATED: CPT

## 2024-02-09 PROCEDURE — 36415 COLL VENOUS BLD VENIPUNCTURE: CPT
